# Patient Record
Sex: MALE | Race: WHITE | Employment: FULL TIME | URBAN - NONMETROPOLITAN AREA
[De-identification: names, ages, dates, MRNs, and addresses within clinical notes are randomized per-mention and may not be internally consistent; named-entity substitution may affect disease eponyms.]

---

## 2020-12-21 ENCOUNTER — TELEPHONE (OUTPATIENT)
Dept: FAMILY MEDICINE CLINIC | Age: 25
End: 2020-12-21

## 2020-12-22 NOTE — TELEPHONE ENCOUNTER
I suspect pt already has been informed. Call if worsening sxs. Monitor closely. Symptomatic treatment recommended. Duration of isolation and precautions    For most persons with COVID-19 illness, isolation and precautions can generally be discontinued 10 days after symptom onset1 and resolution of fever for at least 24 hours, without the use of fever-reducing medications, and with improvement of other symptoms.  A limited number of persons with severe illness may produce replication-competent virus beyond 10 days that may warrant extending duration of isolation and precautions for up to 20 days after symptom onset; consider consultation with infection control experts.  For persons who never develop symptoms, isolation and other precautions can be discontinued 10 days after the date of their first positive RT-PCR test for SARS-CoV-2 RNA.

## 2022-10-05 ENCOUNTER — HOSPITAL ENCOUNTER (EMERGENCY)
Age: 27
Discharge: HOME | End: 2022-10-05
Attending: EMERGENCY MEDICINE
Payer: COMMERCIAL

## 2022-10-05 VITALS
WEIGHT: 185 LBS | TEMPERATURE: 98.3 F | HEART RATE: 68 BPM | HEIGHT: 71 IN | BODY MASS INDEX: 25.9 KG/M2 | SYSTOLIC BLOOD PRESSURE: 122 MMHG | RESPIRATION RATE: 18 BRPM | DIASTOLIC BLOOD PRESSURE: 70 MMHG | OXYGEN SATURATION: 99 %

## 2022-10-05 DIAGNOSIS — G44.019 EPISODIC CLUSTER HEADACHE, NOT INTRACTABLE: Primary | ICD-10-CM

## 2022-10-05 PROCEDURE — 99284 EMERGENCY DEPT VISIT MOD MDM: CPT

## 2022-10-05 ASSESSMENT — PAIN - FUNCTIONAL ASSESSMENT
PAIN_FUNCTIONAL_ASSESSMENT: 0-10
PAIN_FUNCTIONAL_ASSESSMENT: NONE - DENIES PAIN
PAIN_FUNCTIONAL_ASSESSMENT: NONE - DENIES PAIN

## 2022-10-05 ASSESSMENT — ENCOUNTER SYMPTOMS
TROUBLE SWALLOWING: 0
BLOOD IN STOOL: 0
FACIAL SWELLING: 0
VOICE CHANGE: 0
VOMITING: 0
ABDOMINAL PAIN: 0
STRIDOR: 0
PHOTOPHOBIA: 0
WHEEZING: 0
NAUSEA: 0
COLOR CHANGE: 0
BACK PAIN: 0
SHORTNESS OF BREATH: 0

## 2022-10-05 ASSESSMENT — PAIN SCALES - GENERAL: PAINLEVEL_OUTOF10: 2

## 2022-10-05 ASSESSMENT — PAIN DESCRIPTION - LOCATION: LOCATION: HEAD

## 2022-10-05 ASSESSMENT — PAIN DESCRIPTION - DESCRIPTORS: DESCRIPTORS: THROBBING

## 2022-10-05 NOTE — ED NOTES
Pt A & O x3 and without s/s distress or discomfort, resps even and unlab. Pt states he feels \"almost back to normal\".  Cardiac monitor applied     Cr Sheffield RN  10/05/22 8592

## 2022-10-05 NOTE — ED PROVIDER NOTES
[facility]  eMERGENCY dEPARTMENT eNCOUnter      Pt Name: Rinku Styles  MRN: [id number]  [place] 1995  Date of evaluation: 10/5/2022  Provider: Avelina Cotto MD    CHIEF COMPLAINT       Chief Complaint   Patient presents with    Headache     C/o sudden onset of R sided HA with generalized weakness         HISTORY OF PRESENT ILLNESS   (Location/Symptom, Timing/Onset, Context/Setting, Quality, Duration, Modifying Factors, Severity)  Note limiting factors. Rinku Styles is a 32 y.o. male reports shortly after suffering a right sided headache and generalized weakness. Patient reports he has a history of cluster headaches and that this is identical to multiple previous episodes. He reports he was at work and suffered the sudden onset of right-sided pain as well as generalized weakness. Denies any syncope neck stiffness fever or trauma or altered mental status. He reports that this is a new job and that they had not seen him have a cluster headache before and therefore the panic would call 911. Patient reports he does not feel he needs emergency evaluation his symptoms are already substantially improving. He denies any new or differing symptoms from previous cluster headaches. HPI    Nursing Notes were reviewed. REVIEW OFSYSTEMS    (2-9 systems for level 4, 10 or more for level 5)     Review of Systems   Constitutional:  Negative for appetite change, fever and unexpected weight change. HENT:  Negative for facial swelling, trouble swallowing and voice change. Eyes:  Negative for photophobia and visual disturbance. Respiratory:  Negative for shortness of breath, wheezing and stridor. Cardiovascular:  Negative for chest pain and palpitations. Gastrointestinal:  Negative for abdominal pain, blood in stool, nausea and vomiting. Genitourinary:  Negative for difficulty urinating and dysuria. Musculoskeletal:  Negative for back pain, gait problem and neck pain.    Skin: Negative for color change and wound. Neurological:  Positive for weakness and headaches. Negative for seizures, syncope and speech difficulty. Psychiatric/Behavioral:  Negative for self-injury and suicidal ideas. Except as noted above the remainder of the review of systems was reviewed and negative. PAST MEDICAL HISTORY     Past Medical History:   Diagnosis Date    Cluster headaches     Reflux          SURGICAL HISTORY       Past Surgical History:   Procedure Laterality Date    KNEE SURGERY      left ACL reconstruction    TYMPANOSTOMY TUBE PLACEMENT           CURRENT MEDICATIONS       Previous Medications    No medications on file       ALLERGIES     Patient has no known allergies. FAMILY HISTORY       Family History   Problem Relation Age of Onset    High Blood Pressure Maternal Grandmother     High Cholesterol Maternal Grandmother     Arthritis Maternal Grandmother           SOCIAL HISTORY       Social History     Socioeconomic History    Marital status: Single     Spouse name: None    Number of children: None    Years of education: None    Highest education level: None   Tobacco Use    Smoking status: Never    Smokeless tobacco: Never   Substance and Sexual Activity    Alcohol use: No    Drug use: No         PHYSICAL EXAM    (up to 7 for level 4, 8 or more for level 5)     ED Triage Vitals [10/05/22 1455]   BP Temp Temp Source Heart Rate Resp SpO2 Height Weight   123/80 98.4 °F (36.9 °C) Oral 70 18 100 % 5' 11\" (1.803 m) 185 lb (83.9 kg)       Physical Exam  Vitals and nursing note reviewed. Constitutional:       General: He is not in acute distress. Appearance: He is well-developed. HENT:      Head: Normocephalic and atraumatic. Right Ear: External ear normal.      Left Ear: External ear normal.   Eyes:      Extraocular Movements: Extraocular movements intact. Conjunctiva/sclera: Conjunctivae normal.      Pupils: Pupils are equal, round, and reactive to light.    Neck: Vascular: No JVD. Trachea: No tracheal deviation. Cardiovascular:      Rate and Rhythm: Normal rate. Pulmonary:      Effort: Pulmonary effort is normal. No respiratory distress. Breath sounds: Normal breath sounds. No wheezing. Abdominal:      General: There is no distension. Palpations: Abdomen is soft. Tenderness: There is no abdominal tenderness. There is no guarding or rebound. Musculoskeletal:         General: No tenderness. Normal range of motion. Cervical back: Neck supple. Skin:     General: Skin is warm and dry. Neurological:      General: No focal deficit present. Mental Status: He is alert and oriented to person, place, and time. Cranial Nerves: No cranial nerve deficit. Comments: Normal speech and mental status. Symmetric smile. No facial droop. Sensation intact and equal in all CN V distributions of the face bilaterally. 5/5 strength in all 4 extremities. Normal gait. Normal sensation equal in all 4 extremities. Negative romberg. Normal finger to nose and heel to shin. EMERGENCY DEPARTMENT COURSE and DIFFERENTIAL DIAGNOSIS/MDM:   Vitals:    Vitals:    10/05/22 1455   BP: 123/80   Pulse: 70   Resp: 18   Temp: 98.4 °F (36.9 °C)   TempSrc: Oral   SpO2: 100%   Weight: 185 lb (83.9 kg)   Height: 5' 11\" (1.803 m)         MDM  Patient reports near complete resolution of symptoms on arrival to the emergency department and after being placed on 4 L nasal cannula for a short period of time reports complete resolution of all symptoms. He declines work-up and reports is identical to multiple previous cluster headaches, he is not concerned about other emergent pathology, and request to be discharged home.   We have discussed potential alternative causes such as brain tumor, intracranial hemorrhage, aneurysm, meningitis, etc. however based on complete resolution of symptoms as well as normal neurologic exam, normal vitals, and patient reporting is identical to previous cluster headaches I do feel he is appropriate for discharge home with standard ER return precautions and outpatient neurology or primary care follow-up. Patient expresses understanding and agreement with plan and is discharged home. Procedures    FINAL IMPRESSION      1. Episodic cluster headache, not intractable          DISPOSITION/PLAN   DISPOSITION Decision To Discharge 10/05/2022 03:58:15 PM      PATIENT REFERRED TO:  Gregoria Myers MD  [id number] [facility]  [phone]    In 1 week      [facility] AND LUN[facility] [place]. [facility] Emergency Department  [facility] [address]  [phone]    If symptoms worsen        (Please note that portions of this note were completed with a voice recognition program.  Efforts were made to edit the dictations but occasionally words aremis-transcribed. )    Rashi Parks MD (electronically signed)  Attending Emergency Physician           Rashi Parks MD  10/05/22 8513